# Patient Record
Sex: FEMALE | Race: WHITE | URBAN - METROPOLITAN AREA
[De-identification: names, ages, dates, MRNs, and addresses within clinical notes are randomized per-mention and may not be internally consistent; named-entity substitution may affect disease eponyms.]

---

## 2017-10-03 ENCOUNTER — AMBULATORY - HEALTHEAST (OUTPATIENT)
Dept: NURSING | Facility: CLINIC | Age: 62
End: 2017-10-03

## 2018-10-23 ENCOUNTER — TRANSFERRED RECORDS (OUTPATIENT)
Dept: HEALTH INFORMATION MANAGEMENT | Facility: CLINIC | Age: 63
End: 2018-10-23

## 2018-12-04 ENCOUNTER — TRANSFERRED RECORDS (OUTPATIENT)
Dept: HEALTH INFORMATION MANAGEMENT | Facility: CLINIC | Age: 63
End: 2018-12-04

## 2019-01-10 ENCOUNTER — OFFICE VISIT (OUTPATIENT)
Dept: OPHTHALMOLOGY | Facility: CLINIC | Age: 64
End: 2019-01-10
Attending: OPHTHALMOLOGY
Payer: COMMERCIAL

## 2019-01-10 DIAGNOSIS — H53.10 SUBJECTIVE VISUAL DISTURBANCE: Primary | ICD-10-CM

## 2019-01-10 DIAGNOSIS — H53.10 SUBJECTIVE VISUAL DISTURBANCE: ICD-10-CM

## 2019-01-10 DIAGNOSIS — H53.40 VISUAL FIELD DEFECT: Primary | ICD-10-CM

## 2019-01-10 PROBLEM — F33.1 MAJOR DEPRESSIVE DISORDER, RECURRENT EPISODE, MODERATE (H): Status: ACTIVE | Noted: 2019-01-10

## 2019-01-10 PROCEDURE — 92083 EXTENDED VISUAL FIELD XM: CPT | Mod: ZF | Performed by: OPHTHALMOLOGY

## 2019-01-10 PROCEDURE — 92133 CPTRZD OPH DX IMG PST SGM ON: CPT | Mod: ZF | Performed by: OPHTHALMOLOGY

## 2019-01-10 PROCEDURE — G0463 HOSPITAL OUTPT CLINIC VISIT: HCPCS | Mod: ZF

## 2019-01-10 ASSESSMENT — CUP TO DISC RATIO
OS_RATIO: 0.5
OD_RATIO: 0.6

## 2019-01-10 ASSESSMENT — EXTERNAL EXAM - RIGHT EYE: OD_EXAM: NORMAL

## 2019-01-10 ASSESSMENT — VISUAL ACUITY
METHOD: SNELLEN - LINEAR
OS_PH_SC: 20/20
OS_SC: 20/40
OD_SC: 20/20

## 2019-01-10 ASSESSMENT — CONF VISUAL FIELD
OD_NORMAL: 1
OS_NORMAL: 1

## 2019-01-10 ASSESSMENT — TONOMETRY
OS_IOP_MMHG: 12
OD_IOP_MMHG: 12
IOP_METHOD: ICARE

## 2019-01-10 ASSESSMENT — SLIT LAMP EXAM - LIDS
COMMENTS: NORMAL
COMMENTS: NORMAL

## 2019-01-10 ASSESSMENT — EXTERNAL EXAM - LEFT EYE: OS_EXAM: NORMAL

## 2019-01-10 NOTE — PROGRESS NOTES
Assessment & Plan     Chinyere Sumner is a 63 year old female with the following diagnoses:   1. Visual field defect    2. Subjective visual disturbance       Referred for photopsia's/scintilating scotomas in the left eye.  She describes them as squiggly lines that resemble heat wave lines.  When she bends over she notices an increase in these symptoms in both eyes.  The heat wave sensation has persisted for a very long time - ever since she was young. She states that she sees it every day and that it occurs for about 25% of the day.  She notes that she can make it go away with blinking and then may come back.  If she looks to the side then it disappears. She noticed an increase in these symptoms approximately 9 months ago.  She says that she had a PVD 1 year ago and she thinks this is related to that.  No eye pain or discomfort.  Constitutionally has been doing well.  No issues with central vision. No significant headaches.  Has visual field testing done at referring center, reviewed today and appears to be within normal limits.    On exam her vision is 20/20 and 20/40 (ph 20/20) (both uncorrected) in the right and left eyes.  Her IOPs are normal.  Her color plates are full and there is no afferent pupillary defect.  Her anterior segment exam is unremarkable.  Her retinal nerve fiber layer and macula appear normal on optical coherence tomography.      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  I do not I do not feel this is due to stroke.  I do not see outer retinal loss around the optic nerve on optical coherence tomography that might suggest acute zonal occult outer retinopathy.  Her symptoms have been present for 50 years or more on a daily basis.  Her visual field is normal.  I do not think that this represents anything sinister.  I do not have an explanation for it, but I do not think it would cause vision loss.  The patient is comfortable with observation.      Patient has floaters LEFT eye.  Could consider yag vitreolysis if bothersome.           Attending Physician Attestation:  Complete documentation of historical and exam elements from today's encounter can be found in the full encounter summary report (not reduplicated in this progress note).  I personally obtained the chief complaint(s) and history of present illness.  I confirmed and edited as necessary the review of systems, past medical/surgical history, family history, social history, and examination findings as documented by others; and I examined the patient myself.  I personally reviewed the relevant tests, images, and reports as documented above.  I formulated and edited as necessary the assessment and plan and discussed the findings and management plan with the patient and family. - Ryne Stroud MD

## 2019-01-10 NOTE — NURSING NOTE
No chief complaint on file.    Chief Complaint(s) and History of Present Illness(es)     Chinyere Sumner is a 63 year old female who presents today for  Subjective visual disturbance    Episodes of waves/ripples of light superotemporally left eye. When she bends over, the flashes of light are seen in both eyes, with a pulsating effect.   Longstanding episodes. Used to occur when young.   Return of symptoms 9 months ago. No precipitating event.     Magalys GRAJEDA 8:33 AM January 10, 2019

## 2019-01-10 NOTE — LETTER
1/10/2019         RE:  :  MRN: Chinyere Sumner  1955  [id number]     Dear Dr. Connors,    Thank you for asking me to see your very pleasant patient, Chinyere Sumner, in neuro-ophthalmic consultation.  I would like to thank you for sending your records and I have summarized them in the history of present illness. She presented with her spouse who provided additional history.  My assessment and plan are below.  For further details, please see my attached clinic note.         Assessment & Plan     Chinyere Sumner is a 63 year old female with the following diagnoses:   1. Visual field defect    2. Subjective visual disturbance       Referred for photopsia's/scintilating scotomas in the left eye.  She describes them as squiggly lines that resemble heat wave lines.  When she bends over she notices an increase in these symptoms in both eyes.  The heat wave sensation has persisted for a very long time - ever since she was young. She states that she sees it every day and that it occurs for about 25% of the day.  She notes that she can make it go away with blinking and then may come back.  If she looks to the side then it disappears. She noticed an increase in these symptoms approximately 9 months ago.  She says that she had a PVD 1 year ago and she thinks this is related to that.  No eye pain or discomfort.  Constitutionally has been doing well.  No issues with central vision. No significant headaches.  Has visual field testing done at referring center, reviewed today and appears to be within normal limits.    On exam her vision is 20/20 and 20/40 (ph 20/20) (both uncorrected) in the right and left eyes.  Her IOPs are normal.  Her color plates are full and there is no afferent pupillary defect.  Her anterior segment exam is unremarkable.  Her retinal nerve fiber layer and macula appear normal on optical coherence tomography.      It is my impression that patient has photopsias of squiggly lines in the  left eye.  I do not I do not feel this is due to stroke.  I do not see outer retinal loss around the optic nerve on optical coherence tomography that might suggest acute zonal occult outer retinopathy.  Her symptoms have been present for 50 years or more on a daily basis.  Her visual field is normal.  I do not think that this represents anything sinister.  I do not have an explanation for it, but I do not think it would cause vision loss.  The patient is comfortable with observation.     Patient has floaters LEFT eye.  Could consider yag vitreolysis if bothersome.      Again, thank you for allowing me to participate in the care of your patient.      Sincerely,    Ryne Stroud MD  Professor  Ophthalmology Residency   Director of Neuro-Ophthalmology  [facility] [facility] [employer] Endowed Chair  Departments of Ophthalmology, Neurology, and Neurosurgery  [facility] [address] [place] [address]  [id number]  T - [phone]  F - [phone]  AVERY [email or web address]      CC: SARI SANCHEZ  [facility]  [address] [facility] [address]  VIA Facsimile: [phone]     MAGDALENE LO  [facility]  [address] [id number]  VIA Outside Provider Messaging

## 2019-02-15 ENCOUNTER — HEALTH MAINTENANCE LETTER (OUTPATIENT)
Age: 64
End: 2019-02-15

## 2019-10-04 ENCOUNTER — HEALTH MAINTENANCE LETTER (OUTPATIENT)
Age: 64
End: 2019-10-04

## 2020-11-14 ENCOUNTER — HEALTH MAINTENANCE LETTER (OUTPATIENT)
Age: 65
End: 2020-11-14

## 2021-06-13 NOTE — PROGRESS NOTES
Chief Complaint   Patient presents with     Flu Vaccine     Flu consent and contraindication forms are given/ signed/ reviewed and sent to medical records to scan.     Aubree Gonzalez CMA [facility] 10/3/2017 2:27 PM

## 2021-09-12 ENCOUNTER — HEALTH MAINTENANCE LETTER (OUTPATIENT)
Age: 66
End: 2021-09-12

## 2021-11-07 ENCOUNTER — HEALTH MAINTENANCE LETTER (OUTPATIENT)
Age: 66
End: 2021-11-07

## 2022-01-02 ENCOUNTER — HEALTH MAINTENANCE LETTER (OUTPATIENT)
Age: 67
End: 2022-01-02

## 2022-04-23 ENCOUNTER — HEALTH MAINTENANCE LETTER (OUTPATIENT)
Age: 67
End: 2022-04-23

## 2022-10-30 ENCOUNTER — HEALTH MAINTENANCE LETTER (OUTPATIENT)
Age: 67
End: 2022-10-30

## 2023-06-01 ENCOUNTER — HEALTH MAINTENANCE LETTER (OUTPATIENT)
Age: 68
End: 2023-06-01

## 2023-11-18 ENCOUNTER — HEALTH MAINTENANCE LETTER (OUTPATIENT)
Age: 68
End: 2023-11-18